# Patient Record
Sex: MALE | Race: WHITE | NOT HISPANIC OR LATINO | Employment: FULL TIME | URBAN - METROPOLITAN AREA
[De-identification: names, ages, dates, MRNs, and addresses within clinical notes are randomized per-mention and may not be internally consistent; named-entity substitution may affect disease eponyms.]

---

## 2023-03-23 DIAGNOSIS — Z31.41 ENCOUNTER FOR SEMEN ANALYSIS: Primary | ICD-10-CM

## 2023-04-04 ENCOUNTER — OFFICE VISIT (OUTPATIENT)
Dept: FAMILY MEDICINE | Facility: CLINIC | Age: 33
End: 2023-04-04
Payer: COMMERCIAL

## 2023-04-04 VITALS
RESPIRATION RATE: 18 BRPM | WEIGHT: 196 LBS | OXYGEN SATURATION: 98 % | TEMPERATURE: 96.9 F | BODY MASS INDEX: 25.98 KG/M2 | DIASTOLIC BLOOD PRESSURE: 80 MMHG | HEIGHT: 73 IN | HEART RATE: 87 BPM | SYSTOLIC BLOOD PRESSURE: 130 MMHG

## 2023-04-04 DIAGNOSIS — G89.29 CHRONIC PAIN OF LEFT KNEE: ICD-10-CM

## 2023-04-04 DIAGNOSIS — M79.89 LEG SWELLING: ICD-10-CM

## 2023-04-04 DIAGNOSIS — Z00.00 ROUTINE GENERAL MEDICAL EXAMINATION AT A HEALTH CARE FACILITY: Primary | ICD-10-CM

## 2023-04-04 DIAGNOSIS — M25.562 CHRONIC PAIN OF LEFT KNEE: ICD-10-CM

## 2023-04-04 PROCEDURE — 99385 PREV VISIT NEW AGE 18-39: CPT | Performed by: FAMILY MEDICINE

## 2023-04-04 PROCEDURE — 99213 OFFICE O/P EST LOW 20 MIN: CPT | Mod: 25 | Performed by: FAMILY MEDICINE

## 2023-04-04 RX ORDER — DIPHENHYDRAMINE HCL 25 MG
TABLET ORAL
COMMUNITY

## 2023-04-04 RX ORDER — LANOLIN ALCOHOL/MO/W.PET/CERES
3 CREAM (GRAM) TOPICAL
COMMUNITY
End: 2023-12-08

## 2023-04-04 RX ORDER — CETIRIZINE HYDROCHLORIDE 10 MG/1
10 TABLET ORAL DAILY PRN
COMMUNITY

## 2023-04-04 ASSESSMENT — ENCOUNTER SYMPTOMS
COUGH: 0
HEMATOCHEZIA: 0
DYSURIA: 0
DIZZINESS: 0
HEADACHES: 0
MYALGIAS: 1
FREQUENCY: 0
CONSTIPATION: 0
FEVER: 0
WEAKNESS: 0
ARTHRALGIAS: 1
PALPITATIONS: 0
HEARTBURN: 0
CHILLS: 0
SORE THROAT: 0
HEMATURIA: 0
JOINT SWELLING: 1
ABDOMINAL PAIN: 0
PARESTHESIAS: 0
SHORTNESS OF BREATH: 0
NERVOUS/ANXIOUS: 0
EYE PAIN: 0
DIARRHEA: 0
NAUSEA: 0

## 2023-04-04 ASSESSMENT — PAIN SCALES - GENERAL: PAINLEVEL: NO PAIN (0)

## 2023-04-04 NOTE — PROGRESS NOTES
SUBJECTIVE:   CC: Norman is an 32 year old who presents for preventative health visit.                  Patient has been advised of split billing requirements and indicates understanding: Yes  Healthy Habits:     Getting at least 3 servings of Calcium per day:  Yes    Bi-annual eye exam:  Yes    Dental care twice a year:  NO    Sleep apnea or symptoms of sleep apnea:  None    Diet:  Regular (no restrictions)    Frequency of exercise:  4-5 days/week    Duration of exercise:  30-45 minutes    Taking medications regularly:  Yes    Medication side effects:  None    PHQ-2 Total Score: 0    Additional concerns today:  Yes       Cholesterol: screen next year at 33  Diabetes: screen when 33   Diet/Exercise: weight lifting 3x/ week.   Tobacco: non-user.     Acid reflux  On omeprazole 20 mg   Tolerable.     Ulnar neuropathy.   Right elbow fracture.   Worsened by typing, dictates notes      Musculoskeletal problem/pain      Duration: Since June 2022    Description  Location: Left knee and leg    Intensity:  moderate    Accompanying signs and symptoms: weakness of going down stairs and swelling    History  Previous similar problem: no     Previous evaluation:  Ultrasound negative for blood clot    Precipitating or alleviating factors:  Trauma or overuse: YES- Lyme disease  Aggravating factors include: standing, climbing stairs and exercise    Therapies tried and outcome: rest/inactivity, ice, support wrap and acetaminophen    Anterior medial knee pain.   Knee will give out on him when going downstairs at times.     Had a minor tweak in the knee November of 2022, otherwise no specific trauma or inury.     US on 6/19/2022 negative for DVT.   Has left leg swelling, it has actually slightly improved from the past.   Varicose vein on left leg.       Today's PHQ-2 Score:       4/4/2023     8:58 AM   PHQ-2 ( 1999 [employer])   Q1: Little interest or pleasure in doing things 0   Q2: Feeling down, depressed or hopeless 0   PHQ-2 Score 0    Q1: Little interest or pleasure in doing things Not at all   Q2: Feeling down, depressed or hopeless Not at all   PHQ-2 Score 0       Have you ever done Advance Care Planning? (For example, a Health Directive, POLST, or a discussion with a medical provider or your loved ones about your wishes): Yes, patient states has an Advance Care Planning document and will bring a copy to the clinic.    Social History     Tobacco Use     Smoking status: Never     Smokeless tobacco: Never   Vaping Use     Vaping status: Never Used   Substance Use Topics     Alcohol use: Yes             4/4/2023     8:58 AM   Alcohol Use   Prescreen: >3 drinks/day or >7 drinks/week? No       Last PSA: No results found for: PSA    Reviewed orders with patient. Reviewed health maintenance and updated orders accordingly - Yes    Reviewed and updated as needed this visit by clinical staff   Tobacco  Allergies  Meds  Problems  Med Hx  Surg Hx  Fam Hx          Reviewed and updated as needed this visit by Provider                   Review of Systems   Constitutional: Negative for chills and fever.   HENT: Negative for congestion, ear pain, hearing loss and sore throat.    Eyes: Negative for pain and visual disturbance.   Respiratory: Negative for cough and shortness of breath.    Cardiovascular: Positive for peripheral edema. Negative for chest pain and palpitations.   Gastrointestinal: Negative for abdominal pain, constipation, diarrhea, heartburn, hematochezia and nausea.   Genitourinary: Negative for dysuria, frequency, genital sores, hematuria and urgency.   Musculoskeletal: Positive for arthralgias, joint swelling and myalgias.   Skin: Negative for rash.   Neurological: Negative for dizziness, weakness, headaches and paresthesias.   Psychiatric/Behavioral: Negative for mood changes. The patient is not nervous/anxious.        OBJECTIVE:   /80 (BP Location: Right arm, Patient Position: Chair, Cuff Size: Adult Regular)   Pulse 87    "Temp 96.9  F (36.1  C) (Tympanic)   Resp 18   Ht 1.861 m (6' 1.25\")   Wt 88.9 kg (196 lb)   SpO2 98%   BMI 25.68 kg/m      Physical Exam  GENERAL: healthy, alert and no distress  EYES: Eyes grossly normal to inspection, PERRL and conjunctivae and sclerae normal  HENT: ear canals and TM's normal, nose and mouth without ulcers or lesions  NECK: no adenopathy, no asymmetry, masses, or scars and thyroid normal to palpation  RESP: lungs clear to auscultation - no rales, rhonchi or wheezes  CV: regular rate and rhythm, normal S1 S2, no S3 or S4, no murmur, click or rub, no peripheral edema and peripheral pulses strong  ABDOMEN: soft, nontender, no hepatosplenomegaly, no masses and bowel sounds normal  MSK left leg: left leg is swollen from ankle to upper thigh. Non-erythematous. Prominent varicose vein from lower leg to upper leg on medial side.   MSK knee: mild tenderness over medial joint line. ROM full. Strength full. Sensation intact. Anterior and posterior drawer testing negative. [facility] testing with mild click, no pain.   SKIN: no suspicious lesions or rashes  NEURO: Normal strength and tone, mentation intact and speech normal  PSYCH: mentation appears normal, affect normal/bright      ASSESSMENT/PLAN:   Norman was seen today for joint swelling and physical.    Diagnoses and all orders for this visit:    Routine general medical examination at a health care facility  Doing well, BP well controlled. Active. Check cholesterol and diabetic screen next year.     Leg swelling  -- ongoing for almost a year. Had a DVT US 6/2022 negative for DVT. Swelling has actually slightly improved from patient report. Has a large varicose vein on the medial aspect of the lower and upper leg.   -- Will proceed with US venous competency test, if negative proceed with CT Pelvis with contrast, if unremarkable CT, proceed with Vascular medicine referral   -     US Venous Competency Left; Future    Chronic pain of left knee  -- ongoing " issue, medial joint pain, will give out on him with walking down stairs at times.   -- proceed with sports medicine referral for further evaluation and cares.   -     Orthopedic  Referral; Future        Patient has been advised of split billing requirements and indicates understanding: Yes      COUNSELING:   Reviewed preventive health counseling, as reflected in patient instructions       Regular exercise       Healthy diet/nutrition        He reports that he has never smoked. He has never used smokeless tobacco.            Pravin Dobbins [facility]

## 2023-04-04 NOTE — PATIENT INSTRUCTIONS
Call for appt for Andrology lab   Please call [phone] for scheduling.    Follow up with sports medicine for knee pain    Schedule us venous competency test.     Next steps would be CT pelvis with contrast.       Preventive Health Recommendations  Male Ages 26 - 39    Yearly exam:             See your health care provider every year in order to  o   Review health changes.   o   Discuss preventive care.    o   Review your medicines if your doctor has prescribed any.  You should be tested each year for STDs (sexually transmitted diseases), if you re at risk.   After age 35, talk to your provider about cholesterol testing. If you are at risk for heart disease, have your cholesterol tested at least every 5 years.   If you are at risk for diabetes, you should have a diabetes test (fasting glucose).  Shots: Get a flu shot each year. Get a tetanus shot every 10 years.     Nutrition:  Eat at least 5 servings of fruits and vegetables daily.   Eat whole-grain bread, whole-wheat pasta and brown rice instead of white grains and rice.   Get adequate Calcium and Vitamin D.     Lifestyle  Exercise for at least 150 minutes a week (30 minutes a day, 5 days a week). This will help you control your weight and prevent disease.   Limit alcohol to one drink per day.   No smoking.   Wear sunscreen to prevent skin cancer.   See your dentist every six months for an exam and cleaning.

## 2023-04-18 ENCOUNTER — OFFICE VISIT (OUTPATIENT)
Dept: VASCULAR SURGERY | Facility: CLINIC | Age: 33
End: 2023-04-18
Payer: COMMERCIAL

## 2023-04-18 ENCOUNTER — LAB (OUTPATIENT)
Dept: LAB | Facility: CLINIC | Age: 33
End: 2023-04-18
Payer: COMMERCIAL

## 2023-04-18 DIAGNOSIS — Z31.41 ENCOUNTER FOR SEMEN ANALYSIS: ICD-10-CM

## 2023-04-18 DIAGNOSIS — I83.812 VARICOSE VEINS OF LEG WITH PAIN, LEFT: Primary | ICD-10-CM

## 2023-04-18 DIAGNOSIS — I83.892 VARICOSE VEINS OF LEG WITH EDEMA, LEFT: ICD-10-CM

## 2023-04-18 PROCEDURE — 89322 SEMEN ANAL STRICT CRITERIA: CPT

## 2023-04-18 PROCEDURE — 99203 OFFICE O/P NEW LOW 30 MIN: CPT | Performed by: SPECIALIST

## 2023-04-18 RX ORDER — ONDANSETRON 4 MG/1
4 TABLET, ORALLY DISINTEGRATING ORAL PRN
COMMUNITY
Start: 2023-04-13

## 2023-04-18 NOTE — NURSING NOTE
Patient Reported symptoms:    Right leg   Heaviness None of the time   Achiness None of the time   Swelling None of the time   Throbbing None of the time   Itching None of the time   Appearance Not at all noticeable   Impact on work/activities does impact work/activities    Left Leg   Heaviness Some of the time   Achiness Some of the time   Swelling Most of the time   Throbbing Some of the time   Itching None of the time   Appearance Very noticeable   Impact on work/activities Moderately reduced

## 2023-04-18 NOTE — LETTER
4/18/2023         RE: Norman Locke  [address]  [place] [address]        Dear Colleague,    Thank you for referring your patient, Norman Locke, to the [facility] [place]. Please see a copy of my visit note below.    Consult requested by Dr. Dobbins      Reason for consultation: Left leg varicose veins with pain and swelling    HPI:  Patient is a 32-year-old white male who after being diagnosed with Lyme disease last June and report sudden onset of left leg swelling with varicose veins.  He had an ultrasound at that time did that did not reveal a DVT.  He has been wearing compression since then.  He denies any leg trauma, DVT, superficial phlebitis or nonhealing wounds.  He reports pain achiness and swelling worse at the end of the day.  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  He does wear compression socks and has the symptoms despite compression.  There is no family history of varicose veins.  He was born full-term.  He now presents me for evaluation of his left leg pain swelling and varicose veins.    No past medical history on file.    Past Surgical History:   Procedure Laterality Date     BIOPSY      Skin biopsy     DENTAL SURGERY       ORTHOPEDIC SURGERY      ORIF right ulna, age 9     Current Outpatient Medications   Medication     cetirizine (ZYRTEC) 10 MG tablet     diphenhydrAMINE (BENADRYL) 25 MG tablet     omeprazole (PRILOSEC) 20 MG DR capsule     ondansetron (ZOFRAN ODT) 4 MG ODT tab     melatonin 3 MG tablet     No current facility-administered medications for this visit.      No Known Allergies    Social History     Social History Narrative     Not on file     Family History   Problem Relation Age of Onset     Mental Illness Mother      Coronary Artery Disease Maternal Grandfather      Cerebrovascular Disease Maternal Grandmother      Breast Cancer Maternal Grandmother      Diabetes Paternal Grandfather      Other Cancer Paternal Grandfather       Obesity Brother       ROS: 10 point ROS neg other than the symptoms noted above in the HPI.    PE:  B/P: Data Unavailable, T: Data Unavailable, P: Data Unavailable, R: Data Unavailable  General: well developed, well nourished WM who appears their stated age  HEENT: NC/AT, EOMI, (-)icterus, (-)injection  Neck: Supple, No JVD  Chest: CTA  Abd: Soft, non tender, non distended, non tender, no masses  Ext; Warm, right lower extremity: No edema, no varicosities.  Left lower extremity: +1 calf edema.  Varicosity extending down anterior thigh to medial calf.  No stasis changes  Psych: AAOx3  Neuro: No focal deficits    Impression/plan:  This is a 32-year-old gentleman with symptomatic left leg varicose veins despite compression therapy.  He has a CEAP 3 on the left and CEAP 0 on the right.  I briefly described the etiology of varicose veins as well as the role of ultrasound.  He expressed understanding.  After discussion with the patient the plan at this time is to do a left lower extremity ultrasound and have him follow-up with me after that.  Any further treatment we determined by the ultrasound findings.    Zafar Hidalgo MD, FACS      Again, thank you for allowing me to participate in the care of your patient.        Sincerely,        Zafar Hidalgo MD

## 2023-04-18 NOTE — PATIENT INSTRUCTIONS
Varicose Veins and Spider Veins    Varicose veins are swollen, enlarged veins most often found in the legs. They are usually blue or purple in color and may bulge, twist, and stand out under the skin. Spider veins are small veins just under your skin that can look red, blue or purple.        Normally, veins return blood from the body to the heart. The leg veins have one-way valves that prevent blood from flowing backward in the vein. When the valves are weak or damaged, blood backs up in the veins. This may cause some of the veins to swell and bulge and become varicose veins.     Symptoms  Varicose veins may or may not cause symptoms. If symptoms do occur, they can include:  Legs that feel tired, achy, heavy, or itchy  Leg swelling  Leg muscle cramps  Skin changes, such as discoloration, dryness, redness, or rash (in more severe cases, you may also have sores on the skin called venous leg ulcers)    Risk factors  There are a number of factors that increase the risk for varicose veins. These can include:   Being a woman  Being older  Sitting or standing for long periods  Being overweight  Being pregnant  Having a family history of varicose veins  Hormones, birth control pills    Treatment starts with simple self-help measures (see below). If these don't help, there are many procedures that can be done to shrink or remove varicose veins. Your healthcare provider can tell you more about these options, if needed.     Home care  Support or compression stockings will likely be prescribed. If so, be sure to wear them as directed. They may help improve blood flow.  Exercising helps strengthen your leg muscles and improve blood flow. To get the most benefit, choose exercises such as walking, swimming, or cycling. Also try to exercise for at least 30 minutes on most days.  Raising your legs above heart level will help relieve swelling and keep blood from pooling in veins. Try to elevate your legs for 15 to 20 minutes at  the end of the day, and whenever you're relaxing. To make sure your legs are raised above heart level, prop them up on cushions or large pillows.  To keep blood moving when you have to sit or stand for long periods, try these tips:  At work, take walking breaks instead of coffee breaks. Walk during your lunch hour. Or try flexing your feet up and down 10 times each hour.  When standing, raise yourself up and down on your toes, or rock back and forth on your heels.  If you are overweight, talk with your healthcare provider about setting up a weight-loss plan. Maintaining a healthy weight can help reduce the strain on your veins. It may also improve symptoms, such as swelling and aching.  If you have dryness and itching, ask your provider about special lotions that can be applied to the skin to help improve symptoms.     Follow-up care  Follow up with your healthcare provider, or as directed. If imaging tests were done, you'll be told the results and if there are any new findings that affect your care.     When to seek medical advice  Call your healthcare provider right away if any of these occur:  Sudden, severe leg swelling, pain, or redness  Symptoms worsen, or they don't improve with self-care  Bleeding from any affected veins  Ulcers form on the legs, ankles, or feet  Fever of 100.4 F (38 C) or higher, or as advised by your provider    [employer] last reviewed this educational content on 4/1/2018 2000-2021 [employer]. All rights reserved. This information is not intended as a substitute for professional medical care. Always follow your healthcare professional's instructions.    Self-Care for Spider and Varicose Veins    Your healthcare provider may suggest that you try self-care. Exercising and maintaining a healthy weight may keep problem veins from getting worse. Wearing elastic stockings and elevating your legs can help improve blood flow. Taking breaks when you sit or stand helps,  too.        Wearing compression stockings  Compression stockings gently squeeze veins so blood flows upward. If you need compression stockings, your healthcare provider can prescribe them for you. Follow your healthcare provider's advice about how and when to wear them. Compression stockings come in several different levels of pressure. Ask your healthcare provider which level of pressure would help you the most.     Raising your legs above heart level will help relieve swelling and keep blood from pooling in veins. Try to elevate your legs for 15 to 20 minutes at the end of the day, and whenever you're relaxing. To make sure your legs are raised above heart level, prop them up on cushions or large pillows.    To keep blood moving when you have to sit or stand for long periods, try these tips:  At work, take walking breaks instead of coffee breaks. Walk during your lunch hour. Or try flexing your feet up and down 10 times each hour.  When standing, raise yourself up and down on your toes, or rock back and forth on your heels.    [employer] last reviewed this educational content on 11/1/2019 2000-2021 [employer]. All rights reserved. This information is not intended as a substitute for professional medical care. Always follow your healthcare professional's instructions.    Treatment Options    Sclerotherapy  Your healthcare provider will inject the vein with a special chemical that will quickly close the vein from the inside. This is particularly useful for spider veins and smaller varicose veins.      If you have large varicose veins, surgery may be the best choice. But it will not prevent new varicose veins from forming. Surgery is most often done in a surgery center or in the office.    If surgery is recommended for you, your surgery will be tailored to your needs. Varicose veins may be tied off (ligation), destroyed, or removed. Blood will then flow through the healthy veins. One or more of the  following techniques may be used:    Ablation (laser or radiofrequency)  A tiny cut in the skin is made near the varicose vein. A small tube called a catheter is inserted into the vein. Energy or heat released from the catheter tip will make the vein walls collapse and stick together, stopping all blood flow through the vein.         Ablation (glue)  A tiny cut in the skin is made near the varicose vein. A small tube called a catheter is inserted into the vein. Droplets of glue are deposited into the vein to make vein walls collapse and stick together stopping blood flow through the vein.    Microphlebectomy or ambulatory phlebectomy  A special hook is used to gently take out a varicose vein through tiny incisions. Microphlebectomy may be done in your healthcare provider's office.      Vein stripping and ligation (rare)  In more severe cases, the surgeon may tie off and remove veins by making smaller cuts in the skin. Smaller branching veins may also be tied off or removed.    Know about the risks  Your healthcare provider will talk with you about the risks of surgery. These include:  Bleeding or swelling  A sense of numbness, burning, or tingling in areas near the procedure  Edema or swelling in the legs  Clots in the deep veins that may travel to the lungs  Infection  Scarring  Inflammation related to the glue    [employer] last reviewed this educational content on 11/1/2019 (Sclerotherapy image) 12/1/2019 (Radiofrequency ablation image, Microphlebectomy image)    7924-9362 [employer]. All rights reserved. This information is not intended as a substitute for professional medical care. Always follow your healthcare professional's instructions.

## 2023-04-18 NOTE — PROGRESS NOTES
Consult requested by Dr. Dobbins      Reason for consultation: Left leg varicose veins with pain and swelling    HPI:  Patient is a 32-year-old white male who after being diagnosed with Lyme disease last June and report sudden onset of left leg swelling with varicose veins.  He had an ultrasound at that time did that did not reveal a DVT.  He has been wearing compression since then.  He denies any leg trauma, DVT, superficial phlebitis or nonhealing wounds.  He reports pain achiness and swelling worse at the end of the day.  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  He does wear compression socks and has the symptoms despite compression.  There is no family history of varicose veins.  He was born full-term.  He now presents me for evaluation of his left leg pain swelling and varicose veins.    No past medical history on file.    Past Surgical History:   Procedure Laterality Date     BIOPSY      Skin biopsy     DENTAL SURGERY       ORTHOPEDIC SURGERY      ORIF right ulna, age 9     Current Outpatient Medications   Medication     cetirizine (ZYRTEC) 10 MG tablet     diphenhydrAMINE (BENADRYL) 25 MG tablet     omeprazole (PRILOSEC) 20 MG DR capsule     ondansetron (ZOFRAN ODT) 4 MG ODT tab     melatonin 3 MG tablet     No current facility-administered medications for this visit.      No Known Allergies    Social History     Social History Narrative     Not on file     Family History   Problem Relation Age of Onset     Mental Illness Mother      Coronary Artery Disease Maternal Grandfather      Cerebrovascular Disease Maternal Grandmother      Breast Cancer Maternal Grandmother      Diabetes Paternal Grandfather      Other Cancer Paternal Grandfather      Obesity Brother       ROS: 10 point ROS neg other than the symptoms noted above in the HPI.    PE:  B/P: Data Unavailable, T: Data Unavailable, P: Data Unavailable, R: Data Unavailable  General: well developed, well nourished WM who appears their stated  age  HEENT: NC/AT, EOMI, (-)icterus, (-)injection  Neck: Supple, No JVD  Chest: CTA  Abd: Soft, non tender, non distended, non tender, no masses  Ext; Warm, right lower extremity: No edema, no varicosities.  Left lower extremity: +1 calf edema.  Varicosity extending down anterior thigh to medial calf.  No stasis changes  Psych: AAOx3  Neuro: No focal deficits    Impression/plan:  This is a 32-year-old gentleman with symptomatic left leg varicose veins despite compression therapy.  He has a CEAP 3 on the left and CEAP 0 on the right.  I briefly described the etiology of varicose veins as well as the role of ultrasound.  He expressed understanding.  After discussion with the patient the plan at this time is to do a left lower extremity ultrasound and have him follow-up with me after that.  Any further treatment we determined by the ultrasound findings.    Zafar Hidalgo MD, FACS

## 2023-04-19 LAB
ABNORMAL SPERM MORPHOLOGY: 95
ABSTINENCE DAYS: 4.5 DAYS (ref 2–7)
AGGLUTINATION: NO
ANALYSIS TEMP - CENTIGRADE: 21 CENTIGRADE
COLLECTION METHOD: NORMAL
COLLECTION SITE: NORMAL
CONSENT TO RELEASE TO PARTNER: YES
DAL- RECEIVED TIME: NORMAL
HEAD DEFECT: 95 %
IMMOTILE: 28 %
LIQUEFIED: YES
MIDPIECE DEFECT: 34 %
NON-PROGRESSIVE MOTILITY: 4 %
NORMAL SPERM MORPHOLOGY: 5 % NORMAL FORMS
PROGRESSIVE MOTILITY: 68 %
ROUND CELLS: 0 MILLION/ML
SPECIMEN PH: 7.2 PH
SPECIMEN VOLUME: 2.5 ML
SPERM CONCENTRATION: 179.5 MILLION/ML
TAIL DEFECT: 1 %
TIME OF ANALYSIS: NORMAL
TOTAL PROGRESSIVE MOTILE NUMBER: 305 MILLION
TOTAL SPERM NUMBER: 449 MILLION
VISCOUS: NO
VITALITY: NORMAL

## 2023-04-25 ENCOUNTER — ANCILLARY PROCEDURE (OUTPATIENT)
Dept: ULTRASOUND IMAGING | Facility: CLINIC | Age: 33
End: 2023-04-25
Attending: SPECIALIST
Payer: COMMERCIAL

## 2023-04-25 PROCEDURE — 93971 EXTREMITY STUDY: CPT | Mod: LT | Performed by: SPECIALIST

## 2023-05-02 ENCOUNTER — OFFICE VISIT (OUTPATIENT)
Dept: ORTHOPEDICS | Facility: CLINIC | Age: 33
End: 2023-05-02
Payer: COMMERCIAL

## 2023-05-02 ENCOUNTER — VIRTUAL VISIT (OUTPATIENT)
Dept: VASCULAR SURGERY | Facility: CLINIC | Age: 33
End: 2023-05-02
Attending: SPECIALIST
Payer: COMMERCIAL

## 2023-05-02 ENCOUNTER — ANCILLARY PROCEDURE (OUTPATIENT)
Dept: GENERAL RADIOLOGY | Facility: CLINIC | Age: 33
End: 2023-05-02
Attending: FAMILY MEDICINE
Payer: COMMERCIAL

## 2023-05-02 VITALS
HEIGHT: 73 IN | BODY MASS INDEX: 25.71 KG/M2 | DIASTOLIC BLOOD PRESSURE: 92 MMHG | WEIGHT: 194 LBS | SYSTOLIC BLOOD PRESSURE: 135 MMHG

## 2023-05-02 DIAGNOSIS — M25.562 CHRONIC PAIN OF LEFT KNEE: ICD-10-CM

## 2023-05-02 DIAGNOSIS — M25.562 CHRONIC PATELLOFEMORAL PAIN OF LEFT KNEE: Primary | ICD-10-CM

## 2023-05-02 DIAGNOSIS — I83.812 VARICOSE VEINS OF LEG WITH PAIN, LEFT: Primary | ICD-10-CM

## 2023-05-02 DIAGNOSIS — G89.29 CHRONIC PAIN OF LEFT KNEE: ICD-10-CM

## 2023-05-02 DIAGNOSIS — I83.892 VARICOSE VEINS OF LEG WITH EDEMA, LEFT: ICD-10-CM

## 2023-05-02 DIAGNOSIS — G89.29 CHRONIC PATELLOFEMORAL PAIN OF LEFT KNEE: Primary | ICD-10-CM

## 2023-05-02 PROCEDURE — 73562 X-RAY EXAM OF KNEE 3: CPT | Mod: TC | Performed by: RADIOLOGY

## 2023-05-02 PROCEDURE — 99243 OFF/OP CNSLTJ NEW/EST LOW 30: CPT | Performed by: FAMILY MEDICINE

## 2023-05-02 PROCEDURE — 99213 OFFICE O/P EST LOW 20 MIN: CPT | Mod: VID | Performed by: SPECIALIST

## 2023-05-02 NOTE — LETTER
2023         RE: Norman Locke  [address]  [facility] [address]        Dear Colleague,    Thank you for referring your patient, Norman Locke, to the [facility] [place]. Please see a copy of my visit note below.    Norman is a 32 year old who is being evaluated via a billable video visit.      How would you like to obtain your AVS? MyChart  If the video visit is dropped, the invitation should be resent by: Text to cell phone: [phone]  Will anyone else be joining your video visit? No        Video-Visit Details    Type of service:  Video Visit     Follow-up ultrasound    Subjective:  Patient is a 32-year-old white male who after being diagnosed with Lyme disease last  and report sudden onset of left leg swelling with varicose veins.  He had an ultrasound at that time did that did not reveal a DVT.  He has been wearing compression since then.  He denies any leg trauma, DVT, superficial phlebitis or nonhealing wounds.  He reports pain achiness and swelling worse at the end of the day.  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  He does wear compression socks and has the symptoms despite compression.  There is no family history of varicose veins.  He was born full-term.       He had his ultrasound which she now follows up.  He did see sports medicine and he does have a knee effusion as well that is most likely secondary to some arthritis.  He does have some symptoms despite compression therapy    Objective:  B/P: Data Unavailable, T: Data Unavailable, P: Data Unavailable, R: Data Unavailable  Ext; Warm, right lower extremity: No edema, no varicosities.  Left lower extremity: +1 calf edema.  Varicosity extending down anterior thigh to medial calf.  No stasis changes      US -   Name:  Norman Locke                                                          Patient ID: [id number]  Date: 2023                                                    :  1990  Sex: male                                                                    Examined by: FRED Demarco RVT  Age:  32 year old                                                         Reading MD: Zafar Hidalgo MD     INDICATION:  Left leg varicose veins with swelling and pain     EXAM TYPE  LEFT LOWER EXTREMITY VENOUS DUPLEX FOR VENOUS INSUFFICIENCY TECHNICAL SUMMARY     A duplex ultrasound study using color flow was performed, to evaluate the left lower extremity veins for valvular incompetence with the patient in a steep reversed trendelenberg.      LEFT:     The deep veins demonstrate phasic flow, compress and respond to augmentations.  There is no DVT.  The common femoral, mid femoral and popliteal veins are incompetent and free of thrombus. The remaining deep veins are competent and free of thrombus.      The GSV demonstrates phasic flow, compresses and responds to augmentations from the saphenofemoral junction to the ankle with no evidence of thrombus. The great saphenous vein measures 7.4 mm at the saphenofemoral junction, 5.3 mm at the proximal thigh and 1.7 mm at the knee. The GSV is incompetent from SFJ to Mid Thigh and from the knee to mid calf, with the greatest reflux time of 8199 milliseconds.  The GSV gives rise to multiple incompetent varicose veins, the largest measures 10.2 mm off the Proximal Thigh 11 cm below the SFJ that courses Medial with a reflux time of 3596 milliseconds.      The AASV is competent ( 1.9 mm) draining into the saphenofemoral junction.       The Giacomini vein is absent.     The SSV demonstrates phasic flow, compresses and responds to augmentations from the popliteal space to the ankle.  No reflux or thrombus is seen. The distal calf SSV is a bifid system. The saphenopopliteal junction is absent. The SSV is thick-walled from the proximal to mid calf.       Perforators: there is no evidence of incompetent  veins at any level.      RIGHT:     The CFV  demonstrate phasic flow, compress and respond to augmentations.  There is no DVT.       FINAL SUMMARY:  1.  There is no evidence of DVT in either lower extremity, on the left the common femoral vein femoral and popliteal veins are incompetent.  2.  The left GSV     is incompetent from SFJ to mid thigh and again knee to mid calf.  It gives rise to multiple incompetent branches.  3.  The left AASV and SSV are competent.  4.  Incompetence Criteria: Greater than 500 milliseconds reflux in the superficial and  veins and greater than 1000 milliseconds reflux in the deep veins.      Zafar Hidalgo MD, FACS    Assessment/plan:  This is a 32-year-old gentleman with symptomatic left leg varicose veins despite compression therapy.  He has a CEAP 3 on the left and CEAP 0 on the right.  Based on his ultrasound he is a candidate for a left GSV RF ablation with medically necessary stab phlebectomy.  I described the procedure, risks, benefits and alternatives and the patient expressed understanding.  He would like to try the sports medicine recommendations first to see how his leg responds.  He also wants to discuss it with his wife.  He he will call me once he decides how he would like to proceed.    Zafar Hidalgo MD, FACS            Originating Location (pt. Location): Home    Distant Location (provider location):  On-site  Platform used for Video Visit: Aliza        Again, thank you for allowing me to participate in the care of your patient.        Sincerely,        Zafar Hidalgo MD

## 2023-05-02 NOTE — PROGRESS NOTES
Norman is a 32 year old who is being evaluated via a billable video visit.      How would you like to obtain your AVS? MyChart  If the video visit is dropped, the invitation should be resent by: Text to cell phone: [phone]  Will anyone else be joining your video visit? No        Video-Visit Details    Type of service:  Video Visit     Follow-up ultrasound    Subjective:  Patient is a 32-year-old white male who after being diagnosed with Lyme disease last  and report sudden onset of left leg swelling with varicose veins.  He had an ultrasound at that time did that did not reveal a DVT.  He has been wearing compression since then.  He denies any leg trauma, DVT, superficial phlebitis or nonhealing wounds.  He reports pain achiness and swelling worse at the end of the day.  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  He does wear compression socks and has the symptoms despite compression.  There is no family history of varicose veins.  He was born full-term.       He had his ultrasound which she now follows up.  He did see sports medicine and he does have a knee effusion as well that is most likely secondary to some arthritis.  He does have some symptoms despite compression therapy    Objective:  B/P: Data Unavailable, T: Data Unavailable, P: Data Unavailable, R: Data Unavailable  Ext; Warm, right lower extremity: No edema, no varicosities.  Left lower extremity: +1 calf edema.  Varicosity extending down anterior thigh to medial calf.  No stasis changes      US -   Name:  Norman Locke                                                          Patient ID: [id number]  Date: 2023                                                    : 1990  Sex: male                                                                    Examined by: FRED Demarco RVT  Age:  32 year old                                                         Reading MD: Zafar Hidalgo MD     INDICATION:  Left leg varicose  veins with swelling and pain     EXAM TYPE  LEFT LOWER EXTREMITY VENOUS DUPLEX FOR VENOUS INSUFFICIENCY TECHNICAL SUMMARY     A duplex ultrasound study using color flow was performed, to evaluate the left lower extremity veins for valvular incompetence with the patient in a steep reversed trendelenberg.      LEFT:     The deep veins demonstrate phasic flow, compress and respond to augmentations.  There is no DVT.  The common femoral, mid femoral and popliteal veins are incompetent and free of thrombus. The remaining deep veins are competent and free of thrombus.      The GSV demonstrates phasic flow, compresses and responds to augmentations from the saphenofemoral junction to the ankle with no evidence of thrombus. The great saphenous vein measures 7.4 mm at the saphenofemoral junction, 5.3 mm at the proximal thigh and 1.7 mm at the knee. The GSV is incompetent from SFJ to Mid Thigh and from the knee to mid calf, with the greatest reflux time of 8199 milliseconds.  The GSV gives rise to multiple incompetent varicose veins, the largest measures 10.2 mm off the Proximal Thigh 11 cm below the SFJ that courses Medial with a reflux time of 3596 milliseconds.      The AASV is competent ( 1.9 mm) draining into the saphenofemoral junction.       The Giacomini vein is absent.     The SSV demonstrates phasic flow, compresses and responds to augmentations from the popliteal space to the ankle.  No reflux or thrombus is seen. The distal calf SSV is a bifid system. The saphenopopliteal junction is absent. The SSV is thick-walled from the proximal to mid calf.       Perforators: there is no evidence of incompetent  veins at any level.      RIGHT:     The CFV demonstrate phasic flow, compress and respond to augmentations.  There is no DVT.       FINAL SUMMARY:  1.  There is no evidence of DVT in either lower extremity, on the left the common femoral vein femoral and popliteal veins are incompetent.  2.  The left GSV      is incompetent from SFJ to mid thigh and again knee to mid calf.  It gives rise to multiple incompetent branches.  3.  The left AASV and SSV are competent.  4.  Incompetence Criteria: Greater than 500 milliseconds reflux in the superficial and  veins and greater than 1000 milliseconds reflux in the deep veins.      Zafar Hidalgo MD, FACS    Assessment/plan:  This is a 32-year-old gentleman with symptomatic left leg varicose veins despite compression therapy.  He has a CEAP 3 on the left and CEAP 0 on the right.  Based on his ultrasound he is a candidate for a left GSV RF ablation with medically necessary stab phlebectomy.  I described the procedure, risks, benefits and alternatives and the patient expressed understanding.  He would like to try the sports medicine recommendations first to see how his leg responds.  He also wants to discuss it with his wife.  He he will call me once he decides how he would like to proceed.    Zafar Hidalgo MD, FACS            Originating Location (pt. Location): Home    Distant Location (provider location):  On-site  Platform used for Video Visit: Aliza

## 2023-05-02 NOTE — PROGRESS NOTES
Norman Locke  :  1990  DOS: 2023  MRN: [id number]    Sports Medicine Clinic Visit    PCP: No Ref-Primary, Physician    Norman Locke is a 32 year old male who is seen in consultation at the request of  Pravin Dobbins D.O. presenting with left knee pain with waxing and waning swelling.    Injury: Gradual onset of pain over the past 11+ months that initially started after resuming exercise program/running.  Pain located over left anterior medial knee, radiating to medial thigh.  Additional Features:  Positive: swelling, grinding and weakness.  Symptoms are better with Ice, Ibuprofen and Rest, compression.  Symptoms are worse with: running/prolonged walking, bending knee, stairs, going from sit to stand position.  Other evaluation and/or treatments so far consists of: Ice, Aleve, Ibuprofen, Rest and compression socks, Vascular surgery consult.  Recent imaging completed: ultrasound completed 23.  Prior History of related problems: history of lyme's disease in 2022 that caused left knee joint swelling.  Patient has completed course of antibiotics that have resolved other symptoms from this infection.    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    No past medical history on file.  Past Surgical History:   Procedure Laterality Date     BIOPSY      Skin biopsy     DENTAL SURGERY       ORTHOPEDIC SURGERY      ORIF right ulna, age 9     Family History   Problem Relation Age of Onset     Mental Illness Mother      Coronary Artery Disease Maternal Grandfather      Cerebrovascular Disease Maternal Grandmother      Breast Cancer Maternal Grandmother      Diabetes Paternal Grandfather      Other Cancer Paternal Grandfather      Obesity Brother        Objective  BP (!) 135/92   Ht  "1.861 m (6' 1.25\")   Wt 88 kg (194 lb)   BMI 25.42 kg/m        General: healthy, alert and in no distress      HEENT: no scleral icterus or conjunctival erythema     Skin: no suspicious lesions or rash. No jaundice.     CV: regular rhythm by palpation, 2+ distal pulses, no pedal edema      Resp: normal respiratory effort without conversational dyspnea     Psych: normal mood and affect      Gait: nonantalgic, appropriate coordination and balance     Neuro: normal light touch sensory exam of the extremities. Motor strength as noted below     Left Knee exam    ROM:        Full active and passive ROM with flexion and extension    Inspection:       no visible ecchymosis       no visible edema or effusion    Skin:       no visible deformities       well perfused       capillary refill brisk    Patellar Motion:        Normal patellar tracking noted through range of motion       Crepitus noted in the patellofemoral joint    Tender:        lateral patellar border       medial joint line      Both very mild    Non Tender:         remainder of knee area    Special Tests:        neg (-) Millicent       neg (-) Lachman       neg (-) anterior drawer       neg (-) posterior drawer       neg (-) varus at 0 deg and 30 deg       neg (-) valgus at 0 deg and 30 deg       Very mild pain with PF grind    Evaluation of ipsilateral kinetic chain       Symmetric strength with hip extension and abduction, modest core stability overall, no quad atrophy      Radiology  Recent Results (from the past 744 hour(s))   US Venous Competency Left    Narrative    Name:  Norman Locke                                                            Patient ID: [id number]  Date: 2023                                                      : 1990  Sex: male    Impression           Examined by: FRED Demarco RVT  Age:  32 year old                                                           Reading MD: Zafar Hidalgo MD     INDICATION:  Left leg " varicose veins with swelling and pain     EXAM TYPE  LEFT LOWER EXTREMITY VENOUS DUPLEX FOR VENOUS INSUFFICIENCY TECHNICAL   SUMMARY     A duplex ultrasound study using color flow was performed, to evaluate the   left lower extremity veins for valvular incompetence with the patient in a   steep reversed trendelenberg.      LEFT:     The deep veins demonstrate phasic flow, compress and respond to   augmentations.  There is no DVT.  The common femoral, mid femoral and   popliteal veins are incompetent and free of thrombus. The remaining deep   veins are competent and free of thrombus.      The GSV demonstrates phasic flow, compresses and responds to augmentations   from the saphenofemoral junction to the ankle with no evidence of   thrombus. The great saphenous vein measures 7.4 mm at the saphenofemoral   junction, 5.3 mm at the proximal thigh and 1.7 mm at the knee. The GSV is   incompetent from SFJ to Mid Thigh and from the knee to mid calf, with the   greatest reflux time of 8199 milliseconds.  The GSV gives rise to multiple   incompetent varicose veins, the largest measures 10.2 mm off the Proximal   Thigh 11 cm below the SFJ that courses Medial with a reflux time of 3596   milliseconds.      The AASV is competent ( 1.9 mm) draining into the saphenofemoral junction.         The Giacomini vein is absent.     The SSV demonstrates phasic flow, compresses and responds to augmentations   from the popliteal space to the ankle.  No reflux or thrombus is seen. The   distal calf SSV is a bifid system. The saphenopopliteal junction is   absent. The SSV is thick-walled from the proximal to mid calf.       Perforators: there is no evidence of incompetent  veins at any   level.      RIGHT:     The CFV demonstrate phasic flow, compress and respond to augmentations.    There is no DVT.       FINAL SUMMARY:  1.  There is no evidence of DVT in either lower extremity, on the left the   common femoral vein femoral and  popliteal veins are incompetent.  2.  The left GSV     is incompetent from SFJ to mid thigh and again knee   to mid calf.  It gives rise to multiple incompetent branches.  3.  The left AASV and SSV are competent.  4.  Incompetence Criteria: Greater than 500 milliseconds reflux in the   superficial and  veins and greater than 1000 milliseconds reflux   in the deep veins.      Zafar Hidalgo MD, FACS   XR Knee Standing AP [place] Bilat Lat Left    Narrative    XR KNEE STANDING AP SUNRISE BILAT LAT LEFT   5/2/2023 9:29 AM     HISTORY: Chronic pain of left knee; Chronic pain of left knee  COMPARISON: None.       Impression    IMPRESSION:     LEFT KNEE: No acute fracture or dislocation. Joint spaces are  preserved. There is a knee joint effusion.    RIGHT KNEE: Frontal and sunrise views of the right knee demonstrate  preserved joint spaces.    KENROY GALLARDO MD         SYSTEM ID:  [id number]         Assessment:  1. Chronic patellofemoral pain of left knee    2. Chronic pain of left knee        Plan:  Discussed the assessment with the patient.  Follow up:  6-8 wks prn  Offered PT, signs of PFS, likely secondary to increase in high-demand exercise and complicated by deconditioning from infections from COVID and Lyme in the last year  Low impact activity strategies and core building options reviewed  Apply HEP to both legs  Need to work on hip and knee stabilization reviewed, training principles reviewed  Use of short 1-2 wk course of NSAIDs reviewed, dosing and precautions reviewed if utilized, consider trying topical Voltaren gel  Otherwise try to use tylenol prn, and not before exercise or training to mask symptoms  RICE reviewed, as well as potential brace usage and principles of appropriate brace usage for comfort without becoming dependent  XR images independently visualized and reviewed with patient today in clinic  Could consider advanced imaging in the future, defer for now  Painfree activity ok,  limit squatting, lunging, jumping, stairs  We discussed modified progressive pain-free activity as tolerated  Home handouts provided and supportive care reviewed  All questions were answered today  Contact us with additional questions or concerns  Signs and sx of concern reviewed    Thanks very much for sending this nice gentleman to us, I will keep you updated with his progress      Dale Covarrubias DO, CA  Sports Medicine Physician  [facility]          Disclaimer: This note consists of symbols derived from keyboarding, dictation and/or voice recognition software. As a result, there may be errors in the script that have gone undetected. Please consider this when interpreting information found in this chart.

## 2023-05-02 NOTE — LETTER
2023         RE: Norman Locke  [address]  [facility] [address]        Dear Colleague,    Thank you for referring your patient, Norman Locke, to the [facility] [place]. Please see a copy of my visit note below.    Norman Locke  :  1990  DOS: 2023  MRN: [id number]    Sports Medicine Clinic Visit    PCP: No Ref-Primary, Physician    Norman Locke is a 32 year old male who is seen in consultation at the request of  Pravin Dobbins D.O. presenting with left knee pain with waxing and waning swelling.    Injury: Gradual onset of pain over the past 11+ months that initially started after resuming exercise program/running.  Pain located over left anterior medial knee, radiating to medial thigh.  Additional Features:  Positive: swelling, grinding and weakness.  Symptoms are better with Ice, Ibuprofen and Rest, compression.  Symptoms are worse with: running/prolonged walking, bending knee, stairs, going from sit to stand position.  Other evaluation and/or treatments so far consists of: Ice, Aleve, Ibuprofen, Rest and compression socks, Vascular surgery consult.  Recent imaging completed: ultrasound completed 23.  Prior History of related problems: history of lyme's disease in 2022 that caused left knee joint swelling.  Patient has completed course of antibiotics that have resolved other symptoms from this infection.    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    No past medical history on file.  Past Surgical History:   Procedure Laterality Date     BIOPSY      Skin biopsy     DENTAL SURGERY       ORTHOPEDIC SURGERY      ORIF right ulna, age 9     Family History   Problem Relation Age of Onset     Mental Illness Mother       "Coronary Artery Disease Maternal Grandfather      Cerebrovascular Disease Maternal Grandmother      Breast Cancer Maternal Grandmother      Diabetes Paternal Grandfather      Other Cancer Paternal Grandfather      Obesity Brother        Objective  BP (!) 135/92   Ht 1.861 m (6' 1.25\")   Wt 88 kg (194 lb)   BMI 25.42 kg/m        General: healthy, alert and in no distress      HEENT: no scleral icterus or conjunctival erythema     Skin: no suspicious lesions or rash. No jaundice.     CV: regular rhythm by palpation, 2+ distal pulses, no pedal edema      Resp: normal respiratory effort without conversational dyspnea     Psych: normal mood and affect      Gait: nonantalgic, appropriate coordination and balance     Neuro: normal light touch sensory exam of the extremities. Motor strength as noted below     Left Knee exam    ROM:        Full active and passive ROM with flexion and extension    Inspection:       no visible ecchymosis       no visible edema or effusion    Skin:       no visible deformities       well perfused       capillary refill brisk    Patellar Motion:        Normal patellar tracking noted through range of motion       Crepitus noted in the patellofemoral joint    Tender:        lateral patellar border       medial joint line      Both very mild    Non Tender:         remainder of knee area    Special Tests:        neg (-) Millicent       neg (-) Lachman       neg (-) anterior drawer       neg (-) posterior drawer       neg (-) varus at 0 deg and 30 deg       neg (-) valgus at 0 deg and 30 deg       Very mild pain with PF grind    Evaluation of ipsilateral kinetic chain       Symmetric strength with hip extension and abduction, modest core stability overall, no quad atrophy      Radiology  Recent Results (from the past 744 hour(s))   US Venous Competency Left    Narrative    Name:  Norman Locke                                                            Patient ID: [id number]  Date: April 25, 2023  "                                                     : 1990  Sex: male    Impression           Examined by: FRED Demarco RVT  Age:  32 year old                                                           Reading MD: Zafar Hidalgo MD     INDICATION:  Left leg varicose veins with swelling and pain     EXAM TYPE  LEFT LOWER EXTREMITY VENOUS DUPLEX FOR VENOUS INSUFFICIENCY TECHNICAL   SUMMARY     A duplex ultrasound study using color flow was performed, to evaluate the   left lower extremity veins for valvular incompetence with the patient in a   steep reversed trendelenberg.      LEFT:     The deep veins demonstrate phasic flow, compress and respond to   augmentations.  There is no DVT.  The common femoral, mid femoral and   popliteal veins are incompetent and free of thrombus. The remaining deep   veins are competent and free of thrombus.      The GSV demonstrates phasic flow, compresses and responds to augmentations   from the saphenofemoral junction to the ankle with no evidence of   thrombus. The great saphenous vein measures 7.4 mm at the saphenofemoral   junction, 5.3 mm at the proximal thigh and 1.7 mm at the knee. The GSV is   incompetent from SFJ to Mid Thigh and from the knee to mid calf, with the   greatest reflux time of 8199 milliseconds.  The GSV gives rise to multiple   incompetent varicose veins, the largest measures 10.2 mm off the Proximal   Thigh 11 cm below the SFJ that courses Medial with a reflux time of 3596   milliseconds.      The AASV is competent ( 1.9 mm) draining into the saphenofemoral junction.         The Giacomini vein is absent.     The SSV demonstrates phasic flow, compresses and responds to augmentations   from the popliteal space to the ankle.  No reflux or thrombus is seen. The   distal calf SSV is a bifid system. The saphenopopliteal junction is   absent. The SSV is thick-walled from the proximal to mid calf.       Perforators: there is no evidence of incompetent   veins at any   level.      RIGHT:     The CFV demonstrate phasic flow, compress and respond to augmentations.    There is no DVT.       FINAL SUMMARY:  1.  There is no evidence of DVT in either lower extremity, on the left the   common femoral vein femoral and popliteal veins are incompetent.  2.  The left GSV     is incompetent from SFJ to mid thigh and again knee   to mid calf.  It gives rise to multiple incompetent branches.  3.  The left AASV and SSV are competent.  4.  Incompetence Criteria: Greater than 500 milliseconds reflux in the   superficial and  veins and greater than 1000 milliseconds reflux   in the deep veins.      Zafar Hidalgo MD, FACS   XR Knee Standing AP [place] Bilat Lat Left    Narrative    XR KNEE STANDING AP [place] BILAT LAT LEFT   5/2/2023 9:29 AM     HISTORY: Chronic pain of left knee; Chronic pain of left knee  COMPARISON: None.       Impression    IMPRESSION:     LEFT KNEE: No acute fracture or dislocation. Joint spaces are  preserved. There is a knee joint effusion.    RIGHT KNEE: Frontal and sunrise views of the right knee demonstrate  preserved joint spaces.    KENROY GALLARDO MD         SYSTEM ID:  [id number]         Assessment:  1. Chronic patellofemoral pain of left knee    2. Chronic pain of left knee        Plan:  Discussed the assessment with the patient.  Follow up:  6-8 wks prn  Offered PT, signs of PFS, likely secondary to increase in high-demand exercise and complicated by deconditioning from infections from COVID and Lyme in the last year  Low impact activity strategies and core building options reviewed  Apply HEP to both legs  Need to work on hip and knee stabilization reviewed, training principles reviewed  Use of short 1-2 wk course of NSAIDs reviewed, dosing and precautions reviewed if utilized, consider trying topical Voltaren gel  Otherwise try to use tylenol prn, and not before exercise or training to mask symptoms  RICE reviewed, as well as  potential brace usage and principles of appropriate brace usage for comfort without becoming dependent  XR images independently visualized and reviewed with patient today in clinic  Could consider advanced imaging in the future, defer for now  Painfree activity ok, limit squatting, lunging, jumping, stairs  We discussed modified progressive pain-free activity as tolerated  Home handouts provided and supportive care reviewed  All questions were answered today  Contact us with additional questions or concerns  Signs and sx of concern reviewed    Thanks very much for sending this nice gentleman to us, I will keep you updated with his progress      Dale Covarrubias DO, [facility]  Sports Medicine Physician  [facility]          Disclaimer: This note consists of symbols derived from keyboarding, dictation and/or voice recognition software. As a result, there may be errors in the script that have gone undetected. Please consider this when interpreting information found in this chart.      Again, thank you for allowing me to participate in the care of your patient.        Sincerely,        Dale Covarrubias DO

## 2023-05-21 ENCOUNTER — HEALTH MAINTENANCE LETTER (OUTPATIENT)
Age: 33
End: 2023-05-21

## 2023-08-25 DIAGNOSIS — Z31.69 INFERTILITY COUNSELING: ICD-10-CM

## 2023-08-25 DIAGNOSIS — N96 RECURRENT PREGNANCY LOSS: Primary | ICD-10-CM

## 2023-08-29 ENCOUNTER — TELEPHONE (OUTPATIENT)
Dept: VASCULAR SURGERY | Facility: CLINIC | Age: 33
End: 2023-08-29
Payer: COMMERCIAL

## 2023-08-29 NOTE — TELEPHONE ENCOUNTER
"Pt is calling to report since his virtual, that he wants to pursue the recommended procedure. He saw Dr. Hidalgo 5/2/23 and Dr. Hidalgo stated \"Based on his ultrasound he is a candidate for a left GSV RF ablation with medically necessary stab phlebectomy. I described the procedure, risks, benefits and alternatives and the patient expressed understanding. He would like to try the sports medicine recommendations first to see how his leg responds. He also wants to discuss it with his wife. He he will call me once he decides how he would like to proceed.\"    Per pt, Dr. Hidalgo would just write an order form to submit and pt does not need an appointment.  "

## 2023-08-30 ENCOUNTER — MEDICAL CORRESPONDENCE (OUTPATIENT)
Dept: HEALTH INFORMATION MANAGEMENT | Facility: CLINIC | Age: 33
End: 2023-08-30

## 2023-08-30 NOTE — TELEPHONE ENCOUNTER
Called and LVM for patient letting him know I would speak with Dr. Hidalgo today and have him fill out an order form to submit to insurance. Instructed patient to call back. Call back number provided.

## 2023-08-30 NOTE — PATIENT INSTRUCTIONS
Pre-Procedure Instructions:                           VNUS Closure and Phlebectomies   You are having a Closure(s) - where one or more veins are closed and Phlebectomies - where one or more veins are removed.   Insurance  Precertification and/or referral authorization may be required by your insurance company.  We will call your insurance company to verify benefits for the medically necessary part of your procedure.    Your Current Medications and Allergies  To reduce bruising, please do not take aspirin-type medications (Motrin, Aleve, Ibuprofen, Advil, etc.) for three days before your procedure. You may take Tylenol if you need a pain reliever.  Are you on blood thinner medications? (Plavix, Coumadin, Eliquis, Xarelto) Please discuss this with your surgeon. You may resume taking your blood thinner medication after your procedure.  Are you sensitive to latex or adhesives used for fake fingernails? Please let us know!    Driving Escort and   Please arrange to have a trusted adult (18 years old or older) drive you to and from the clinic.  For your safety, we recommend you have a trusted adult to stay with you until the next morning.    Your Health  If you have a change in your health before the procedure, contact our office immediately. (For example: cold symptoms, cough, urinary tract infection, fever, flu symptoms.)  A pre-procedure physical is not required.    Note  It is sometimes necessary to adjust the procedure schedule due to emergencies. We greatly appreciate your flexibility and understanding in this matter.                  Check List: The Morning of Your Procedure  ___1. Please do not put anything on your leg(s) or shave the day of your procedure.  ___2. You may take your normal medications the day of your procedure.  ___3. It is recommended you eat a light breakfast or lunch the day of your procedure.  ___4. Wear comfortable loose-fitting clothing and wide-fitting shoes (i.e. tennis shoes,  slip-ons).  ___5. Please arrive at our clinic at the specified time given by the nurse.  ___6. You will sign an affirmation of informed consent.  ___7. Bring your pre-procedure sedation medication (lorazepam and clonidine) with you to the clinic.              One hour before your procedure, you will be instructed to take these medications. The lorazepam              (Ativan) lowers anxiety and sedates you; the clonidine makes the lorazepam more effective. Everyone's              body processes these medications differently. Therefore, reactions to these medications vary. Some              people stay awake and some people sleep through the whole procedure. You may not remember              everything about the procedure or the day. You do not want to make any big decisions for the rest of the              day.    The Day of Your Procedure:       VNUS Closure and Phlebectomies  In the Exam Room  A nurse will bring you back to an exam room with your family member or friend. This is when your informed consent will be signed, and you will take your pre-procedure medications.  You will be asked to remove everything from the waist down, including undergarments. You will then put on a hospital gown or shorts and blue booties.  Your surgeon will come in to answer any questions and santos any bulging varicose veins to be removed.  You will be taken to the restroom to empty your bladder before going into the procedure room.    In the Procedure Room  You will be escorted to the procedure room. You will lie on a procedure table covered with a sheet or blanket.  A nurse will put a blood pressure cuff on your arm and a pulse/oxygen monitor on a finger. Your vital signs will be monitored every 15 minutes.  Your gown will be pulled up slightly and the groin exposed for a short period of time. The surgeon's assistant will clean your foot, leg, and groin with an antibacterial solution. We will get you covered up as quickly as  possible!  Sterile towels and blue drapes will be used to cover you and the table. You will be asked to keep your hands under the blue drapes during the procedure.  The lights will be turned down. The table will be tipped so your head is higher than your feet. You may feel like you're going to slide off, but you won't.    The Procedure  The surgeon will visualize your veins with an ultrasound machine. He or she will then numb your skin and access the vein. A catheter is passed up the vein and positioned with ultrasound guidance. The table will then be tipped head down.  Once the catheter is in the correct position, medication will be injected to numb your leg. You will feel some needle sticks and may feel discomfort as the medication goes in. Once this is done, you should not experience significant discomfort. But if you do, please let us know and more numbing medication can be injected. As the catheter sends out heat, the vein closes off and the catheter is withdrawn.  For the phlebectomy part of the procedure, small incisions are made where the bulging varicose veins have been marked on your leg(s) and these veins will be removed using a small trevor hook instrument.    Post-Procedure  Once the procedure is done, your leg(s) will be washed with warm water and dried. Your leg(s) will be bandaged with large soft dressings and a large ACE bandage wrapped from toes to groin.   You will be offered something to drink and a light snack.  You will rest with your leg(s) elevated for approximately 30 minutes. Your friend or family member may join you.  For your safety, you will be taken to your car in a wheelchair. If you are able to, it is good to keep your leg(s) elevated on the car ride home.    Post-Procedure Instructions:             VNUS Closure and Phlebectomies      Post-Op Day Zero - The Day of Your Procedure:0  1. Medication for Pain Control and Inflammation Control   - The numbing medication injected during your  procedure will last for several hours. The pre-procedure                 tablets may make you very sleepy and you might not remember everything from the procedure or from                 the day. This will usually wear off by the next day.   - Ibuprofen:  If tolerated, take ibuprofen (e.g., Advil) to reduce inflammation whether or not you have                 pain. For three days, take two tablets (200 mg each) with every meal and at bedtime with a snack. If                 your pain is not controlled with ibuprofen, you may take prescription pain medication (such as Norco),                 if prescribed.   - You may resume taking any medications you were taking before your procedure.  2. Activity   - Rest with your leg(s) elevated above your heart. This will prevent from a lot of swelling and                 bleeding. You do not need to elevate your leg(s) while sleeping at night. You may go upstairs, sit up to                 eat, use the bathroom, and take several five-minute walks. Otherwise, keep your leg(s) elevated.                 Minimize the amount of time you are up on your feet to about 30 minutes at a time.  3. Bandages   - The incision sites will be covered with soft bandages and an ACE wrap. Keep your bandages on                 and dry for 48 hours. The ACE should provide  snug  compression but should not cause pain or                 numbness in the toes. If you have significant discomfort or your toes become cold or numb, unwrap                 your ACE and rewrap with less tension starting at the toes wrapping upward.  4. Incisions   - Bleeding: You may see some incision sites that are oozing through the bandages. This is not unusual      and can be managed with Rest, Ice, Compression and Elevation (RICE). Apply ice and firm pressure      directly to the site that is bleeding and rest with your leg(s) elevated above your heart for 20-30                 minutes.    Post-Op Day One:  1.  Medication   - Ibuprofen: Continue the same as the Day of Your Procedure. If your pain is not controlled with                 ibuprofen, you may take prescription pain medication (such as Norco), if prescribed.   2. Activity   - We would like you to get up at least six times and walk around for short periods of time, unless it is      causing you pain. You should not be on your feet more than 90 minutes at a time. Elevate your leg      above your heart when you are not walking.  3. Bandages   - Your bandages must be kept on and dry for 48 hours.  4. Driving   - You may resume driving when you can do so safely. Do not drive if you are taking narcotic pain      medication.  Post-Op Day Two:   1. Medication  - Ibuprofen: Continue the same as the Day of Your Procedure.  2.  Activity  - Walk as tolerated. Elevate as much as possible when not walking.  3. Bandages and Compression  - Remove ACE wrap and padding. Shower and put on your compression hose during waking hours only       for at least 5 days. (Your doctor may instruct you to keep your bandages on until your return     appointment; please follow your doctor's instructions.)  4. Incisions  - Your leg(s) will be bruised; there may be swelling, hard knots under the skin and possibly some     numbness. These will likely resolve over time. If you see  hair-like  strings coming out of your     incisions, do not pull them (this will only cause pain/discomfort). We will trim them when you come     back for your follow-up appointment.  5. Call Us If:   - You see any areas on your leg that are red and angry in appearance.   - You notice any drainage that is milky or cloudy in appearance or that has a foul odor.   - You run a temperature of 100.5 or greater.    Post-Op Day Three:  You will have a follow up appointment 2-4 days post-procedure. At this appointment, you will have an ultrasound and we will check your incisions.     ________________________________________________________________________________________    The Two Weeks Following Your Procedure  1.  Skin Care   - Do not use any lotions, creams or powders on your incisions for 14 days or until the incisions have                 healed.   - Do not soak in a bathtub, hot tub or go swimming for 14 days or until your incisions have healed.  2.  Medications   - You may use ibuprofen or acetaminophen (e.g., Tylenol) as needed for pain or discomfort.  3.  Activity   - Do not lift over 25 pounds. After about two weeks you may resume exercise such as aerobics,                 running, tennis or weightlifting. Use your common sense and ease back into your exercise routine                 slowly.   - You may feel a cord-like tightness along the inside of your leg. Gentle stretching can be helpful.  4. Compression Hose   - Your doctor may instruct you to wear compression for longer than seven days; please follow your                 doctor's instructions. As a comfort measure, you may choose to wear compression for longer than                 required.  5.  Travel   - Do not fly in an airplane for 14 days after your procedure. If you have a long car trip planned within                 two to three weeks following your procedure, stop and walk for a few minutes every two hours.      Periodic ankle pumps during the ride may be helpful.    Six Week Appointment  - At your six-week appointment, you will see your surgeon for an exam and evaluation. This office visit     will be scheduled when you return for post-op day three return appointment.       Return to Work  1.  If you work outside the home, you may return to work in a few days depending on the extent of your        procedure, how you tolerate it, and the type of work you perform.  2.  Paperwork: If your employer requires paperwork or you would like a letter written to your employer, please        let us know. We will complete  disability type forms at no charge. Please allow five business days for forms        to be completed.

## 2023-08-30 NOTE — PROGRESS NOTES
August 30, 2023    Vein Procedure Recommendation    Called and spoke with patient.    Dr. Hidalgo has recommended patient to have the following vein procedure(s):     1. Left leg VNUS closure GSV and 20-30 Phlebectomies (medically necessary)    Preferred Pharmacy: [facility] in [place].  Anticoagulant/ASA: no  Artificial Joint or Heart Valve: no  Open ulcer: no  Sedation nausea: no      Patient is recommended to wear Thigh High compression hose following his procedure. Discussed compression hose. Explained to patient if insurance doesn't cover the compression hose there are a couple different options to getting them and to call the clinic to let us know if they need help.**PATIENT GETTING ON HIS OWN**    Entered in patient's After Visit Summary (viewable in [employer]) written procedure instructions to review on his own (see After Visit Summary).    Next steps:    Insurance Submission  Informed patient this process could take up to 14 business days, but once approved, the patient will be contacted by our surgery scheduler to schedule the above procedure. Gave patient our surgery scheduler's information.    Patient is in agreement with all of the above and has no further questions at this time.    Jazmin Oakley RN  [facility]  Vein Clinic

## 2023-09-15 DIAGNOSIS — I83.812 VARICOSE VEINS OF LEG WITH PAIN, LEFT: Primary | ICD-10-CM

## 2023-09-26 ENCOUNTER — TELEPHONE (OUTPATIENT)
Dept: VASCULAR SURGERY | Facility: CLINIC | Age: 33
End: 2023-09-26
Payer: COMMERCIAL

## 2023-09-26 DIAGNOSIS — I83.892 VARICOSE VEINS OF LEG WITH EDEMA, LEFT: ICD-10-CM

## 2023-09-26 DIAGNOSIS — I83.812 VARICOSE VEINS OF LEG WITH PAIN, LEFT: Primary | ICD-10-CM

## 2023-09-26 RX ORDER — LORAZEPAM 1 MG/1
TABLET ORAL
Qty: 3 TABLET | Refills: 0 | Status: SHIPPED | OUTPATIENT
Start: 2023-09-26 | End: 2023-12-08

## 2023-09-26 RX ORDER — CLONIDINE HYDROCHLORIDE 0.1 MG/1
TABLET ORAL
Qty: 1 TABLET | Refills: 0 | Status: SHIPPED | OUTPATIENT
Start: 2023-09-26 | End: 2023-12-08

## 2023-09-26 NOTE — TELEPHONE ENCOUNTER
9/26/2023    Vein Clinic Preoperative Nurse Call    Procedure: Left leg VNUS closure GSV(med nec), 20-30 stab phlebs(med nec)   Date: 10/4/23  Surgeon: Dr. Hidalgo  Time: 1430  Check in time: 1330    Called and spoke to patient. Informed patient: when to check in (1330) to sign consent, to bring their preop medications in their original bottle with them (3mg ativan, 0.1mg clonidine). Patient will take the medications after signing the consent to the procedure. Instructed patient to wear loose-fitting comfortable clothing, and bring their compression hose. Ensured patient has a /someone that will be responsible for them the rest of the day. Visitors are allowed in the clinic, but they would need to stay in an exam room or wait in the parking lot or leave. If  does leave, IF POSSIBLE, we ask that they do not go more than 15-20 mins from our clinic. Once procedure is completed, we will keep patient in recovery for 30-45 mins, and call  with aftercare instructions. Informed patient, that if possible, they should sit in the backseat to elevate their leg on the ride home.    Pt needs Thigh High compression hose for procedure. Status of the hose: patient has thigh high compression hose. Instructed patient to bring them to his follow up appt on 10/6/23.      Patient understands if they have any of the following symptoms (fever, cough, shortness of breath, rash), they need to notify us immediately as they may need to cancel their procedure and reschedule for a later date.    Patient is in agreement with all of the above and has no further questions at this time.    Jazmin Oakley RN  [facility]

## 2023-11-27 ENCOUNTER — TELEPHONE (OUTPATIENT)
Dept: VASCULAR SURGERY | Facility: CLINIC | Age: 33
End: 2023-11-27
Payer: COMMERCIAL

## 2023-11-27 NOTE — TELEPHONE ENCOUNTER
11/27/2023    Vein Clinic Preoperative Nurse Call    Procedure: Left leg VNUS closure GSV(med nec), 20-30 stab phlebs(med nec)   Date: 12/6/23  Surgeon: Dr. Hidalgo  Time: 1300  Check in time: 1200    Called patient and left a detailed message. Informed patient: when to check in (1200) to sign consent, to bring their preop medications in their original bottle with them (3mg ativan, 0.1mg clonidine). Patient will take the medications after signing the consent to the procedure. Instructed patient to wear loose-fitting comfortable clothing, and bring their compression hose. Ensured patient has a /someone that will be responsible for them the rest of the day. Visitors are allowed in the clinic, but they would need to stay in an exam room or wait in the parking lot or leave. If  does leave, IF POSSIBLE, we ask that they do not go more than 15-20 mins from our clinic. Once procedure is completed, we will keep patient in recovery for 30-45 mins, and call  with aftercare instructions. Informed patient, that if possible, they should sit in the backseat to elevate their leg on the ride home.    Pt needs Thigh High compression hose for procedure. Status of the hose: patient has thigh high hose.    Special instructions: patient should already have sedation medication from previously scheduled procedure in Oct 2023. (Had to reschedule).     Instructed patient to call back and confirm his procedure, that he still has his sedation medications, and HE NEEDS TO MAKE 72 HOUR FOLLOW UP APPT.     Patient understands if they have any of the following symptoms (fever, cough, shortness of breath, rash), they need to notify us immediately as they may need to cancel their procedure and reschedule for a later date.    Gave patient our call back number if any further questions or concerns.    Jazmin Oakley RN  [facility]

## 2023-12-06 ENCOUNTER — OFFICE VISIT (OUTPATIENT)
Dept: VASCULAR SURGERY | Facility: CLINIC | Age: 33
End: 2023-12-06
Payer: COMMERCIAL

## 2023-12-06 VITALS — HEART RATE: 98 BPM | SYSTOLIC BLOOD PRESSURE: 122 MMHG | OXYGEN SATURATION: 94 % | DIASTOLIC BLOOD PRESSURE: 81 MMHG

## 2023-12-06 DIAGNOSIS — I83.892 VARICOSE VEINS OF LEG WITH EDEMA, LEFT: ICD-10-CM

## 2023-12-06 DIAGNOSIS — I83.812 VARICOSE VEINS OF LEG WITH PAIN, LEFT: Primary | ICD-10-CM

## 2023-12-06 PROCEDURE — 37765 STAB PHLEB VEINS XTR 10-20: CPT | Mod: LT | Performed by: SPECIALIST

## 2023-12-06 PROCEDURE — 36475 ENDOVENOUS RF 1ST VEIN: CPT | Mod: LT | Performed by: SPECIALIST

## 2023-12-06 NOTE — PROGRESS NOTES
[facility] Procedure Note    Norman Locke  2023    Norman Locke is a 33 year old year old male. He presents for Vein Procedure  .    /81   Pulse 98   SpO2 94%         2023     1:49 PM   Flowsheet Data   Procedure Start Time: 13:49   Prep: Chloraprep   Side: Left   Tx Length (cm): LEFT GSV: 59   Junction (cm): LEFT GSV: 2.73   RF Cycles: LEFT GSV: 13   RF TX Time (Minutes): LEFT GSV: 4:20   # PHLEB Sites: LEFT LE   Sedation taken: Yes   Pre Pt. Physical / Cognitive Limitations: WNL   TOTAL Local anesthesia Injected (ml): 3   Max Volume Local Anesthesia (ml): 11   TOTAL Tumescent Injected volume (ml): 273   Max Volume Tumescent (ml): 572   Post Pt. Physical / Cognitive Limitations: WNL   Procedure End Time: 14:29   D/C Instructions given, states readiness to leave and escorted to car: Yes       Venus Closure    Date/Time: 2023 2:34 PM    Performed by: Zafar Hidalgo MD  Authorized by: Zafar Hidalgo MD    Time out: Immediately prior to the procedure a time out was called    Preparation: Patient was prepped and draped in usual sterile fashion    1st Assist:  Zo Brannon CST/SALMA    Circulator:  Jazmin Oakley RN    Procedure:  VNUS  Procedure side:  Left  One Vein    Vein Treated:  GSV  Patient tolerance:  Patient tolerated the procedure well with no immediate complications  Wrap/Hose:  Wraps  Phlebectomy    Date/Time: 2023 2:34 PM    Performed by: Zafar Hidalgo MD  Authorized by: Zafar Hidalgo MD    Time out: Immediately prior to the procedure a time out was called    Preparation: Patient was prepped and draped in usual sterile fashion    1st Assist:  Zo Brannon CST/SALMA    Circulator:  Jazmin Oakley RN    Procedure:  Phlebectomies  Type:  Medically Necessary  Procedure side:  Left  Stabs:  10-20  Patient tolerance:  Patient tolerated the procedure well with no immediate complications  Wrap/Hose:  Wraps    Details procedure:  With the  cooperation the patient in the preoperative holding of the left lower extremity was marked as well as areas for stab phlebectomy.  Patient was taken to the procedure and the left leg was prepped and draped in sterile fashion.  Timeout was performed confirming identity the patient was the procedure performed.  The greater saphenous vein was then mapped over its entire length and access point the mid calf was chosen.  The vein was then accessed with a micropuncture needle and a 7 Ukrainian sheath.  The catheter was then passed up the vein and positioned 2.73 cm in the saphenofemoral junction.  Tumescent solution was placed around the vein and radiofrequency ablation was carried out in 7 cm segments.  The areas that were previously marked for stab phlebectomy were infiltrated local anesthetic.  The veins were removed with a combination of trevor hook and mosquito clamps.  This was done for a total of 19 stabs.  Sterile dressings were applied and the patient taken from the procedure back to holding in stable condition to be sent home.      Zafar Hidalgo MD, FACS        Zafar Hidalgo MD, FACS

## 2023-12-06 NOTE — PROGRESS NOTES
Pre-procedure Nursing Note    Norman Locke presents to clinic for Vein Procedure  .   /Person Responsible for Patient: Aria (Spouse)  Phone Number: [phone]    Prophylactic Medication:N/A   Sedation Medication: Ativan, 3mg ,   Time Taken: 1225 and Clonidine, 0.1mg,   Time Taken: 1225  Compression Stockings: Patient left hose at home.  The procedure is being performed on LLE.  Patient understanding of procedure matches consent? YES    Patient's pre-procedure medications verified by AF.    Jazmin Oakley RN on 12/6/2023 at 12:57 PM

## 2023-12-06 NOTE — LETTER
2023         RE: Norman Locke  [address]  [place] [address]        Dear Colleague,    Thank you for referring your patient, Norman Locke, to the [facility] [place]. Please see a copy of my visit note below.    Pre-procedure Nursing Note    Norman Locke presents to clinic for Vein Procedure  .   /Person Responsible for Patient: Aria (Spouse)  Phone Number: [phone]    Prophylactic Medication:N/A   Sedation Medication: Ativan, 3mg ,   Time Taken: 1225 and Clonidine, 0.1mg,   Time Taken: 1225  Compression Stockings: Patient left hose at home.  The procedure is being performed on LLE.  Patient understanding of procedure matches consent? YES    Patient's pre-procedure medications verified by AF.    Jazmin Oakley RN on 2023 at 12:57 PM        [facility] Procedure Note    Norman Locke  2023    Norman Locke is a 33 year old year old male. He presents for Vein Procedure  .    /81   Pulse 98   SpO2 94%         2023     1:49 PM   Flowsheet Data   Procedure Start Time: 13:49   Prep: Chloraprep   Side: Left   Tx Length (cm): LEFT GSV: 59   Junction (cm): LEFT GSV: 2.73   RF Cycles: LEFT GSV: 13   RF TX Time (Minutes): LEFT GSV: 4:20   # PHLEB Sites: LEFT LE   Sedation taken: Yes   Pre Pt. Physical / Cognitive Limitations: WNL   TOTAL Local anesthesia Injected (ml): 3   Max Volume Local Anesthesia (ml): 11   TOTAL Tumescent Injected volume (ml): 273   Max Volume Tumescent (ml): 572   Post Pt. Physical / Cognitive Limitations: WNL   Procedure End Time: 14:29   D/C Instructions given, states readiness to leave and escorted to car: Yes       Venus Closure    Date/Time: 2023 2:34 PM    Performed by: Zafar Hidalgo MD  Authorized by: Zafar Hidalgo MD    Time out: Immediately prior to the procedure a time out was called    Preparation: Patient was prepped and draped in usual sterile fashion    1st Assist:  Zo  LIZ Brannon/SALMA    Circulator:  Jazmin Oakley RN    Procedure:  VNUS  Procedure side:  Left  One Vein    Vein Treated:  GSV  Patient tolerance:  Patient tolerated the procedure well with no immediate complications  Wrap/Hose:  Wraps  Phlebectomy    Date/Time: 12/6/2023 2:34 PM    Performed by: Zafar Hidalgo MD  Authorized by: Zafar Hidalgo MD    Time out: Immediately prior to the procedure a time out was called    Preparation: Patient was prepped and draped in usual sterile fashion    1st Assist:  Zo Brannon CST/SALMA    Circulator:  Jazmin Oakley RN    Procedure:  Phlebectomies  Type:  Medically Necessary  Procedure side:  Left  Stabs:  10-20  Patient tolerance:  Patient tolerated the procedure well with no immediate complications  Wrap/Hose:  Wraps    Details procedure:  With the cooperation the patient in the preoperative holding of the left lower extremity was marked as well as areas for stab phlebectomy.  Patient was taken to the procedure and the left leg was prepped and draped in sterile fashion.  Timeout was performed confirming identity the patient was the procedure performed.  The greater saphenous vein was then mapped over its entire length and access point the mid calf was chosen.  The vein was then accessed with a micropuncture needle and a 7 Marshallese sheath.  The catheter was then passed up the vein and positioned 2.73 cm in the saphenofemoral junction.  Tumescent solution was placed around the vein and radiofrequency ablation was carried out in 7 cm segments.  The areas that were previously marked for stab phlebectomy were infiltrated local anesthetic.  The veins were removed with a combination of trevor hook and mosquito clamps.  This was done for a total of 19 stabs.  Sterile dressings were applied and the patient taken from the procedure back to holding in stable condition to be sent home.      Zafar Hidalgo MD, FACS        Zafar Hidalgo MD, FACS      Again, thank you for  allowing me to participate in the care of your patient.        Sincerely,        Zafar Hidalgo MD

## 2023-12-08 ENCOUNTER — ALLIED HEALTH/NURSE VISIT (OUTPATIENT)
Dept: VASCULAR SURGERY | Facility: CLINIC | Age: 33
End: 2023-12-08
Attending: SPECIALIST
Payer: COMMERCIAL

## 2023-12-08 ENCOUNTER — ANCILLARY PROCEDURE (OUTPATIENT)
Dept: ULTRASOUND IMAGING | Facility: CLINIC | Age: 33
End: 2023-12-08
Attending: SPECIALIST
Payer: COMMERCIAL

## 2023-12-08 DIAGNOSIS — Z09 POSTOP CHECK: Primary | ICD-10-CM

## 2023-12-08 DIAGNOSIS — I83.812 VARICOSE VEINS OF LEG WITH PAIN, LEFT: ICD-10-CM

## 2023-12-08 PROCEDURE — 99207 PR NO CHARGE NURSE ONLY: CPT

## 2023-12-08 PROCEDURE — 93971 EXTREMITY STUDY: CPT | Mod: LT | Performed by: SPECIALIST
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
## 2024-01-30 ENCOUNTER — OFFICE VISIT (OUTPATIENT)
Dept: VASCULAR SURGERY | Facility: CLINIC | Age: 34
End: 2024-01-30
Payer: COMMERCIAL

## 2024-01-30 DIAGNOSIS — I83.812 VARICOSE VEINS OF LEG WITH PAIN, LEFT: Primary | ICD-10-CM

## 2024-01-30 DIAGNOSIS — Z09 POSTOP CHECK: ICD-10-CM

## 2024-01-30 PROCEDURE — 99207 PR VEINSOLUTIONS POST OPERATIVE VISIT: CPT | Performed by: SPECIALIST

## 2024-01-30 NOTE — LETTER
1/30/2024         RE: Norman Locke  [address]  [place] [address]        Dear Colleague,    Thank you for referring your patient, Norman Locke, to the [facility] [place]. Please see a copy of my visit note below.    Follow-up for left leg GSV and phlebectomy    Subjective:  Patient feels good.  Pre-op symptoms have resolved.  Feels leg is much improved.    Objective:  B/P: Data Unavailable, T: Data Unavailable, P: Data Unavailable, R: Data Unavailable  Left lower extremity: Incisions are well-healed.  No residual varicosities.    Assessment/plan:  Patient is status post a left leg GSV RF ablation with stab phlebectomy leg is overall improved.  He will gradually increase his activity as tolerated follow-up with us in 6 months as per protocol    Zafar Hidalgo MD, FACS      Again, thank you for allowing me to participate in the care of your patient.        Sincerely,        Zafar Hidalgo MD

## 2024-03-05 ENCOUNTER — PATIENT OUTREACH (OUTPATIENT)
Dept: CARE COORDINATION | Facility: CLINIC | Age: 34
End: 2024-03-05
Payer: COMMERCIAL

## 2024-05-19 ENCOUNTER — HEALTH MAINTENANCE LETTER (OUTPATIENT)
Age: 34
End: 2024-05-19

## 2024-08-20 ENCOUNTER — OFFICE VISIT (OUTPATIENT)
Dept: VASCULAR SURGERY | Facility: CLINIC | Age: 34
End: 2024-08-20
Attending: SPECIALIST
Payer: COMMERCIAL

## 2024-08-20 ENCOUNTER — ANCILLARY PROCEDURE (OUTPATIENT)
Dept: ULTRASOUND IMAGING | Facility: CLINIC | Age: 34
End: 2024-08-20
Attending: SPECIALIST
Payer: COMMERCIAL

## 2024-08-20 DIAGNOSIS — I83.812 VARICOSE VEINS OF LEG WITH PAIN, LEFT: ICD-10-CM

## 2024-08-20 DIAGNOSIS — I83.812 VARICOSE VEINS OF LEG WITH PAIN, LEFT: Primary | ICD-10-CM

## 2024-08-20 DIAGNOSIS — Z09 POSTOP CHECK: ICD-10-CM

## 2024-08-20 PROCEDURE — 93971 EXTREMITY STUDY: CPT | Mod: LT | Performed by: SPECIALIST

## 2024-08-20 PROCEDURE — 99212 OFFICE O/P EST SF 10 MIN: CPT | Performed by: SPECIALIST

## 2024-08-20 NOTE — PROGRESS NOTES
6-month follow-up of left leg GSV ablation and phlebectomy    Subjective:  Patient feels good.  Pre-op symptoms remain resolved.      Objective:  B/P: Data Unavailable, T: Data Unavailable, P: Data Unavailable, R: Data Unavailable  Left lower extremity: No new residual varicosities.  All incisions healed.      Ultrasound: Left GSV is closed 15 mm from the SFJ to proximal calf.      Assessment/plan:  Patient is status post a left leg GSV RF ablation with stab phlebectomy.  Remains much improved.  Veins remain closed on ultrasound at 6 months.  Will continue activity as tolerated and follow-up with us as necessary.    Zafar Hidalgo MD, FACS

## 2024-08-20 NOTE — LETTER
8/20/2024      Norman Locke  [address]  [place] [address]      Dear Colleague,    Thank you for referring your patient, Norman Locke, to the [facility] [place]. Please see a copy of my visit note below.    6-month follow-up of left leg GSV ablation and phlebectomy    Subjective:  Patient feels good.  Pre-op symptoms remain resolved.      Objective:  B/P: Data Unavailable, T: Data Unavailable, P: Data Unavailable, R: Data Unavailable  Left lower extremity: No new residual varicosities.  All incisions healed.      Ultrasound: Left GSV is closed 15 mm from the SFJ to proximal calf.      Assessment/plan:  Patient is status post a left leg GSV RF ablation with stab phlebectomy.  Remains much improved.  Veins remain closed on ultrasound at 6 months.  Will continue activity as tolerated and follow-up with us as necessary.    Zafar Hidalgo MD, FACS      Again, thank you for allowing me to participate in the care of your patient.        Sincerely,        Zafar Hidalgo MD

## 2024-10-01 ENCOUNTER — PATIENT OUTREACH (OUTPATIENT)
Dept: CARE COORDINATION | Facility: CLINIC | Age: 34
End: 2024-10-01
Payer: COMMERCIAL

## 2025-06-02 SDOH — HEALTH STABILITY: PHYSICAL HEALTH: ON AVERAGE, HOW MANY MINUTES DO YOU ENGAGE IN EXERCISE AT THIS LEVEL?: 50 MIN

## 2025-06-02 SDOH — HEALTH STABILITY: PHYSICAL HEALTH: ON AVERAGE, HOW MANY DAYS PER WEEK DO YOU ENGAGE IN MODERATE TO STRENUOUS EXERCISE (LIKE A BRISK WALK)?: 3 DAYS

## 2025-06-02 ASSESSMENT — SOCIAL DETERMINANTS OF HEALTH (SDOH): HOW OFTEN DO YOU GET TOGETHER WITH FRIENDS OR RELATIVES?: ONCE A WEEK

## 2025-06-03 ENCOUNTER — OFFICE VISIT (OUTPATIENT)
Dept: FAMILY MEDICINE | Facility: CLINIC | Age: 35
End: 2025-06-03
Payer: COMMERCIAL

## 2025-06-03 VITALS
WEIGHT: 199 LBS | TEMPERATURE: 97.8 F | HEART RATE: 77 BPM | SYSTOLIC BLOOD PRESSURE: 122 MMHG | HEIGHT: 73 IN | DIASTOLIC BLOOD PRESSURE: 64 MMHG | OXYGEN SATURATION: 98 % | RESPIRATION RATE: 14 BRPM | BODY MASS INDEX: 26.37 KG/M2

## 2025-06-03 DIAGNOSIS — Z13.220 SCREENING CHOLESTEROL LEVEL: ICD-10-CM

## 2025-06-03 DIAGNOSIS — Z13.1 SCREENING FOR DIABETES MELLITUS: ICD-10-CM

## 2025-06-03 DIAGNOSIS — Z00.00 ROUTINE GENERAL MEDICAL EXAMINATION AT A HEALTH CARE FACILITY: Primary | ICD-10-CM

## 2025-06-03 PROCEDURE — 3078F DIAST BP <80 MM HG: CPT | Performed by: FAMILY MEDICINE

## 2025-06-03 PROCEDURE — 99395 PREV VISIT EST AGE 18-39: CPT | Performed by: FAMILY MEDICINE

## 2025-06-03 PROCEDURE — 3074F SYST BP LT 130 MM HG: CPT | Performed by: FAMILY MEDICINE

## 2025-06-03 PROCEDURE — 1126F AMNT PAIN NOTED NONE PRSNT: CPT | Performed by: FAMILY MEDICINE

## 2025-06-03 ASSESSMENT — PAIN SCALES - GENERAL: PAINLEVEL_OUTOF10: NO PAIN (0)

## 2025-06-03 NOTE — PROGRESS NOTES
"Preventive Care Visit  [facility]  Pravin Dobbins DO, Family Medicine  Steven 3, 2025      Assessment & Plan     Routine general medical examination at a health care facility  Cholesterol: screen when fasting   Diabetes: screen when fasting   Colon Cancer: screen age 43.   Diet/Exercise: active.   Tobacco: non-user     Screening for diabetes mellitus  - Basic metabolic panel  (Ca, Cl, CO2, Creat, Gluc, K, Na, BUN)    Screening cholesterol level  - Lipid panel reflex to direct LDL Non-fasting      Patient has been advised of split billing requirements and indicates understanding: N/A    The risks, benefits and treatment options of prescribed medications or other treatments have been discussed with the patient. The patient verbalized their understanding and should call or follow up if no improvement or if they develop further problems.      BMI  Estimated body mass index is 26.08 kg/m  as calculated from the following:    Height as of this encounter: 1.861 m (6' 1.25\").    Weight as of this encounter: 90.3 kg (199 lb).   Weight management plan: Discussed healthy diet and exercise guidelines    Counseling  Appropriate preventive services were addressed with this patient via screening, questionnaire, or discussion as appropriate for fall prevention, nutrition, physical activity, Tobacco-use cessation, social engagement, weight loss and cognition.  Checklist reviewing preventive services available has been given to the patient.  Reviewed patient's diet, addressing concerns and/or questions.   He is at risk for lack of exercise and has been provided with information to increase physical activity for the benefit of his well-being.         Kenya Austin is a 34 year old, presenting for the following:  Physical (Patient would like to discuss elevated labs. )        6/3/2025     8:36 AM   Additional Questions   Roomed by Rica Hung          HPI      34 year old male who presents for " annual exam.     Cholesterol: screen when fasting   Diabetes: screen when fasting   Colon Cancer: screen age 43.   Diet/Exercise: active.   Tobacco: non-user       Acid reflux   On omeprazole 20 mg daily.   Stable.      Elevated creatinine on recent lab draw.   Creatinine of 1.2    Checked his cholesterol, triglycerides were elevated in the 400's.   Plans to get these rechecked at his clinic/ hospital in the future.       Advance Care Planning    Discussed advance care planning with patient; informed AVS has link to [employer].        6/2/2025   General Health   How would you rate your overall physical health? Good   Feel stress (tense, anxious, or unable to sleep) Only a little   (!) STRESS CONCERN      6/2/2025   Nutrition   Three or more servings of calcium each day? Yes   Diet: Regular (no restrictions)   How many servings of fruit and vegetables per day? 4 or more   How many sweetened beverages each day? 0-1         6/2/2025   Exercise   Days per week of moderate/strenous exercise 3 days   Average minutes spent exercising at this level 50 min         6/2/2025   Social Factors   Frequency of gathering with friends or relatives Once a week   Worry food won't last until get money to buy more No   Food not last or not have enough money for food? No   Do you have housing? (Housing is defined as stable permanent housing and does not include staying outside in a car, in a tent, in an abandoned building, in an overnight shelter, or couch-surfing.) Yes   Are you worried about losing your housing? No   Lack of transportation? No   Unable to get utilities (heat,electricity)? No         6/2/2025   Dental   Dentist two times every year? Yes         Today's PHQ-2 Score:       6/2/2025     4:26 PM   PHQ-2 ( 1999 [employer])   Q1: Little interest or pleasure in doing things 0   Q2: Feeling down, depressed or hopeless 1   PHQ-2 Score 1    Q1: Little interest or pleasure in doing things Not at all   Q2: Feeling down, depressed  "or hopeless Several days   PHQ-2 Score 1       Patient-reported           6/2/2025   Substance Use   Alcohol more than 3/day or more than 7/wk No   Do you use any other substances recreationally? No     Social History     Tobacco Use    Smoking status: Never    Smokeless tobacco: Never   Vaping Use    Vaping status: Never Used   Substance Use Topics    Alcohol use: Yes     Comment: 1 beer weekly.    Drug use: Never           6/2/2025   STI Screening   New sexual partner(s) since last STI/HIV test? No         6/2/2025   Contraception/Family Planning   Questions about contraception or family planning No        Reviewed and updated as needed this visit by Provider   Tobacco  Allergies  Meds  Problems  Med Hx  Surg Hx  Fam Hx            Review of Systems  Constitutional, HEENT, cardiovascular, pulmonary, gi and gu systems are negative, except as otherwise noted.     Objective    Exam  /64   Pulse 77   Temp 97.8  F (36.6  C) (Tympanic)   Resp 14   Ht 1.861 m (6' 1.25\")   Wt 90.3 kg (199 lb)   SpO2 98%   BMI 26.08 kg/m     Estimated body mass index is 26.08 kg/m  as calculated from the following:    Height as of this encounter: 1.861 m (6' 1.25\").    Weight as of this encounter: 90.3 kg (199 lb).    Physical Exam  GENERAL: alert and no distress  EYES: Eyes grossly normal to inspection, PERRL and conjunctivae and sclerae normal  HENT: ear canals and TM's normal, nose and mouth without ulcers or lesions  NECK: no adenopathy, no asymmetry, masses, or scars  RESP: lungs clear to auscultation - no rales, rhonchi or wheezes  CV: regular rate and rhythm, normal S1 S2, no S3 or S4, no murmur, click or rub, no peripheral edema  ABDOMEN: soft, nontender, no hepatosplenomegaly, no masses and bowel sounds normal  MS: no gross musculoskeletal defects noted, no edema  SKIN: no suspicious lesions or rashes  NEURO: Normal strength and tone, mentation intact and speech normal  PSYCH: mentation appears normal, affect " normal/bright        Signed Electronically by: Pravin Dobbins, DO

## 2025-06-03 NOTE — PATIENT INSTRUCTIONS
Patient Education     Schedule fasting labs     Preventive Care Advice   This is general advice given by our system to help you stay healthy. However, your care team may have specific advice just for you. Please talk to your care team about your preventive care needs.  Nutrition  Eat 5 or more servings of fruits and vegetables each day.  Try wheat bread, brown rice and whole grain pasta (instead of white bread, rice, and pasta).  Get enough calcium and vitamin D. Check the label on foods and aim for 100% of the RDA (recommended daily allowance).  Lifestyle  Exercise at least 150 minutes each week  (30 minutes a day, 5 days a week).  Do muscle strengthening activities 2 days a week. These help control your weight and prevent disease.  No smoking.  Wear sunscreen to prevent skin cancer.  Have a dental exam and cleaning every 6 months.  Yearly exams  See your health care team every year to talk about:  Any changes in your health.  Any medicines your care team has prescribed.  Preventive care, family planning, and ways to prevent chronic diseases.  Shots (vaccines)   HPV shots (up to age 26), if you've never had them before.  Hepatitis B shots (up to age 59), if you've never had them before.  COVID-19 shot: Get this shot when it's due.  Flu shot: Get a flu shot every year.  Tetanus shot: Get a tetanus shot every 10 years.  Pneumococcal, hepatitis A, and RSV shots: Ask your care team if you need these based on your risk.  Shingles shot (for age 50 and up)  General health tests  Diabetes screening:  Starting at age 35, Get screened for diabetes at least every 3 years.  If you are younger than age 35, ask your care team if you should be screened for diabetes.  Cholesterol test: At age 39, start having a cholesterol test every 5 years, or more often if advised.  Bone density scan (DEXA): At age 50, ask your care team if you should have this scan for osteoporosis (brittle bones).  Hepatitis C: Get tested at least once in  your life.  STIs (sexually transmitted infections)  Before age 24: Ask your care team if you should be screened for STIs.  After age 24: Get screened for STIs if you're at risk. You are at risk for STIs (including HIV) if:  You are sexually active with more than one person.  You don't use condoms every time.  You or a partner was diagnosed with a sexually transmitted infection.  If you are at risk for HIV, ask about PrEP medicine to prevent HIV.  Get tested for HIV at least once in your life, whether you are at risk for HIV or not.  Cancer screening tests  Cervical cancer screening: If you have a cervix, begin getting regular cervical cancer screening tests starting at age 21.  Breast cancer scan (mammogram): If you've ever had breasts, begin having regular mammograms starting at age 40. This is a scan to check for breast cancer.  Colon cancer screening: It is important to start screening for colon cancer at age 45.  Have a colonoscopy test every 10 years (or more often if you're at risk) Or, ask your provider about stool tests like a FIT test every year or Cologuard test every 3 years.  To learn more about your testing options, visit:   .  For help making a decision, visit:   https://bit.ly/bo75039.  Prostate cancer screening test: If you have a prostate, ask your care team if a prostate cancer screening test (PSA) at age 55 is right for you.  Lung cancer screening: If you are a current or former smoker ages 50 to 80, ask your care team if ongoing lung cancer screenings are right for you.  For informational purposes only. Not to replace the advice of your health care provider. Copyright   2023 [employer]. All rights reserved. Clinically reviewed by the [facility]. [employer] 859683 - REV 01/24.